# Patient Record
Sex: FEMALE | Race: WHITE | NOT HISPANIC OR LATINO
[De-identification: names, ages, dates, MRNs, and addresses within clinical notes are randomized per-mention and may not be internally consistent; named-entity substitution may affect disease eponyms.]

---

## 2022-06-23 PROBLEM — Z00.00 ENCOUNTER FOR PREVENTIVE HEALTH EXAMINATION: Status: ACTIVE | Noted: 2022-06-23

## 2022-06-24 ENCOUNTER — FORM ENCOUNTER (OUTPATIENT)
Age: 52
End: 2022-06-24

## 2022-06-25 ENCOUNTER — APPOINTMENT (OUTPATIENT)
Dept: ORTHOPEDIC SURGERY | Facility: CLINIC | Age: 52
End: 2022-06-25
Payer: COMMERCIAL

## 2022-06-25 ENCOUNTER — APPOINTMENT (OUTPATIENT)
Dept: MRI IMAGING | Facility: CLINIC | Age: 52
End: 2022-06-25
Payer: COMMERCIAL

## 2022-06-25 VITALS — HEIGHT: 63 IN | BODY MASS INDEX: 26.58 KG/M2 | WEIGHT: 150 LBS

## 2022-06-25 PROCEDURE — 72141 MRI NECK SPINE W/O DYE: CPT

## 2022-06-25 PROCEDURE — 72040 X-RAY EXAM NECK SPINE 2-3 VW: CPT

## 2022-06-25 PROCEDURE — 99204 OFFICE O/P NEW MOD 45 MIN: CPT

## 2022-06-25 RX ORDER — METHOCARBAMOL 500 MG/1
500 TABLET, FILM COATED ORAL
Qty: 20 | Refills: 0 | Status: ACTIVE | COMMUNITY
Start: 2022-06-25 | End: 1900-01-01

## 2022-06-25 RX ORDER — HYDROCODONE BITARTRATE AND ACETAMINOPHEN 5; 325 MG/1; MG/1
5-325 TABLET ORAL
Qty: 15 | Refills: 0 | Status: ACTIVE | COMMUNITY
Start: 2022-06-25 | End: 1900-01-01

## 2022-06-27 ENCOUNTER — APPOINTMENT (OUTPATIENT)
Dept: ORTHOPEDIC SURGERY | Facility: CLINIC | Age: 52
End: 2022-06-27

## 2022-06-27 NOTE — HISTORY OF PRESENT ILLNESS
[9] : 9 [Localized] : localized [de-identified] : 51 f with 1 week of left trap pain radiating to the left elbow posteriorly. notes that she visited the  and given medrol, robaxin without any relief. notes pain is severe. no NT. feels crackking in the back . pain at night. 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

## 2022-06-27 NOTE — DISCUSSION/SUMMARY
[de-identified] : appears to have significant cervical radiculopathy unresponsive to oral meds. MRI CS ro hnp. vicodin - advised limited supply. fu pain management\par \par A temporary and limited supply of narcotic pain medication was provided to the patient. Patient was advised to wean usage. Patient was advised of potential side effects, dependency and patient was advised refills may not be provided.\par \par The patient was advised that an advanced imaging study (MRI/CT/etc) will be ordered to evaluate potential pathology in the affected area(s).  The patient was further advised to follow up in the office to review the results of the study and determine further management that may be indicated.\par \par The patient was referred to a Pain Management Specialist for Chemical and Interventional Pain Management.\par

## 2022-06-27 NOTE — IMAGING
[de-identified] : Left shoulder exam: \par \par General: no distress\par Skin: no significant pertinent finding\par Inspection: no obvious deformity, no obvious masses, no swelling, no effusion, no atrophy\par ROM: \par    FF: 180 passive, limimted active due to pain\par    Abduction: 180\par    ER: 70\par    IR: T12\par Tenderness:\par    Anterior: +\par    Posterior: +\par    Lateral: +\par    Biceps: (-)\par    Trapezius: (-)\par    Scapula: (-)\par    AC joint: (-)\par    Crepitus with ROM: (-)\par Additional tests: \par    Neer's: (-)\par    Hawkin's: (-)\par    Garland's: (-)\par    Speed: (-)\par    Cross chest adduction: (-)\par    Liftoff: (-)\par    Bellypress: (-)\par    Abduction/External rotation: (-)\par    GIRD: (-)\par Strength: \par    FF: 5/5\par    ER: 5/5\par    IR: 5/5\par    Biceps: 5/5\par    Triceps: 5/5\par    Distal: 5/5\par Neuro: In tact to light touch throughout\par Vascularity: Extremity warm and well perfused\par \par \par Cervical spine exam: \par \par Skin: no significant pertinent finding\par Inspection: Abnormal alignment (kyphosis/lordosis): (-)   .   Atrophy: (-)   .   Masses: (-)\par ROM: \par    Pain:           Flexion/extension: +.   Rotation: +\par    Stiffness:   Flexion/extension: (-)   .   Rotation: (-)\par Tenderness: \par    Midline: (-)\par    Trapezial:      Right: +.  Left: (-)\par    Paraspinal:    Right: +.  Left: (-)   \par    Rhomboid:     Right: (-)  .  Left: (-)\par    Scapula:        Right: (-)  .  Left: (-)\par Strength: \par    Deltoid:          Right: 5/5   .  Left: 5/5\par    Biceps:          Right: 5/5   .  Left: 5/5\par    Triceps:         Right: 5/5   .  Left: 5/5\par    Wrist flex      Right: 5/5   .   Left: 5/5\par    Wrist ext:      Right: 5/5   .   Left: 5/5\par    Hand:            Right: 5/5   .   Left: 5/5\par Neuro: DTR's wnl.  Sensation to light touch grossly in tact in all distributions. \par    Delfina's: (-)\par    Spurling: (-)\par    Lhermitte: (-)\par Vascularity: Extremity warm and well perfused\par Gait: normal\par \par

## 2022-06-28 ENCOUNTER — APPOINTMENT (OUTPATIENT)
Dept: ORTHOPEDIC SURGERY | Facility: CLINIC | Age: 52
End: 2022-06-28
Payer: COMMERCIAL

## 2022-06-28 PROCEDURE — 99204 OFFICE O/P NEW MOD 45 MIN: CPT

## 2022-06-28 PROCEDURE — 99214 OFFICE O/P EST MOD 30 MIN: CPT

## 2022-06-28 RX ORDER — GABAPENTIN 100 MG/1
100 CAPSULE ORAL
Qty: 60 | Refills: 0 | Status: ACTIVE | COMMUNITY
Start: 2022-06-28 | End: 1900-01-01

## 2022-06-28 RX ORDER — METHOCARBAMOL 750 MG/1
750 TABLET, FILM COATED ORAL 3 TIMES DAILY
Qty: 21 | Refills: 0 | Status: ACTIVE | COMMUNITY
Start: 2022-06-28 | End: 1900-01-01

## 2022-06-28 NOTE — HISTORY OF PRESENT ILLNESS
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 Initial onset 6/18/22, after carrying a 20+ lb daypack all day during travel, she lives in [place], [place]. Feels immobility and limited ROM.  Difficulty with adl's.\par \par She is wearing a sling as directed by [facility] 6/21/22, xrays shoulder -> Medrol completed 6/27/22, Robaxin and diclofenac taken with 10% relief. Pain became worse, she developed constant numbness all fingers except thumb.  Vicodin 5/325 helped her the most. Also c/o numbness biceps area.  Prior hx neck/arm pain years ago for a night with the flu. \par \par MRI C-spine ([facility] 6/27/22)\par Impression:\par 1. C3-4: Central disc herniation indents the thecal sac.\par 2. C4-5: Disc bulge impinges the ventral spinal.\par 3. C5-6: Broad-based central disc herniation impinges the spinal cord. The herniation is superimposed on a disc \par bulge which narrows the neuroforaminal. Moderate disc space narrowing and vertebral end plate changes.\par 4. C6-7: 2 mm retrolisthesis of C6 on C7. Broad-based left subarticular and foraminal disc herniation effaces \par the left lateral recess and causes severe left foraminal stenosis. The herniation is superimposed on a disc bulge \par which narrows the right neuroforamen. Moderate disc space narrowing and vertebral endplate changes.\par 5. C7-T1: Central disc herniation indents the thecal sac.\par \par \par Dr. Dahl notes:\par 6/25/22: 51 f with 1 week of left trap pain radiating to the left elbow posteriorly. notes that she visited the  and given medrol, robaxin without any relief. notes pain is severe. no NT. feels cracking in the back . pain at night. 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

## 2022-06-28 NOTE — IMAGING
[de-identified] : Left shoulder exam: \par \par General: no distress\par Skin: no significant pertinent finding\par Inspection: no obvious deformity, no obvious masses, no swelling, no effusion, no atrophy\par ROM: \par    FF: 180 passive, limited active due to pain\par    Abduction: 180\par    ER: 70\par    IR: T12\par Tenderness:\par    Anterior: +\par    Posterior: +\par    Lateral: +\par    Biceps: (-)\par    Trapezius: (-)\par    Scapula: (-)\par    AC joint: (-)\par    Crepitus with ROM: (-)\par Additional tests: \par    Neer's: (-)\par    Hawkin's: (-)\par    Garland's: (-)\par    Speed: (-)\par    Cross chest adduction: (-)\par    Liftoff: (-)\par    Bellypress: (-)\par    Abduction/External rotation: (-)\par    GIRD: (-)\par Strength: \par    FF: 5/5\par    ER: 5/5\par    IR: 5/5\par    Biceps: 5/5\par    Triceps: 5/5\par    Distal: 5/5\par Neuro: In tact to light touch throughout\par Vascularity: Extremity warm and well perfused\par \par \par Cervical spine exam: \par \par Skin: no significant pertinent finding\par Inspection: Abnormal alignment (kyphosis/lordosis): (-)   .   Atrophy: (-)   .   Masses: (-)\par ROM: \par    Pain:           Flexion/extension: +.   Rotation: +\par    Stiffness:   Flexion/extension: (-)   .   Rotation: (-)\par Tenderness: \par    Midline: (-)\par    Trapezial:      Right: +.  Left: (-)\par    Paraspinal:    Right: +.  Left: (-)   \par    Rhomboid:     Right: (-)  .  Left: (-)\par    Scapula:        Right: (-)  .  Left: (-)\par Strength: \par    Deltoid:          Right: 5/5   .  Left: 5/5\par    Biceps:          Right: 5/5   .  Left: 5/5\par    Triceps:         Right: 5/5   .  Left: 5/5\par    Wrist flex      Right: 5/5   .   Left: 5/5\par    Wrist ext:      Right: 5/5   .   Left: 5/5\par    Hand:            Right: 5/5   .   Left: 5/5\par Neuro: DTR's wnl.  Sensation to light touch grossly in tact in all distributions. \par    Delfina's: (-)\par    Spurling: (-)\par    Lhermitte: (-)\par Vascularity: Extremity warm and well perfused\par Gait: normal

## 2022-06-28 NOTE — DISCUSSION/SUMMARY
[de-identified] : MRI C-spine findings and images reviewed for C6-7 hnp. \par \par MDP completed with minimal relief. \par Will see pain mgmt for further tx and possible MYRIAM's.\par Trial of Gabapentin 100mg, start qhs, may increase up to 300mg.\par D/C sling.\par \par

## 2022-06-30 ENCOUNTER — APPOINTMENT (OUTPATIENT)
Dept: PAIN MANAGEMENT | Facility: CLINIC | Age: 52
End: 2022-06-30

## 2022-06-30 VITALS — WEIGHT: 150 LBS | BODY MASS INDEX: 26.58 KG/M2 | HEIGHT: 63 IN

## 2022-06-30 PROCEDURE — 99214 OFFICE O/P EST MOD 30 MIN: CPT

## 2022-06-30 NOTE — HISTORY OF PRESENT ILLNESS
[Neck] : neck [10] : 10 [4] : 4 [Radiating] : radiating [Sharp] : sharp [Tingling] : tingling [Constant] : constant [Meds] : meds [Walking] : walking [] : no [FreeTextEntry1] : left  [FreeTextEntry6] : numbness [FreeTextEntry7] : left upper back shoulder arms to the fingers,  [FreeTextEntry9] : methocarbomol ,hydrocodone, gabapentin, advil, tylenol  [de-identified] : C MRI

## 2022-06-30 NOTE — DISCUSSION/SUMMARY
[de-identified] : After discussing various treatment options with the patient including but not limited to oral medications, physical therapy, exercise modalities as well as interventional spinal injections, we have decided with the following plan:\par \par - Continue home exercises, stretching, activity modification, physical therapy, and conservative care.\par - MRI report and/or images was reviewed and discussed with the patient.\par - Follow-up as needed.\par

## 2022-06-30 NOTE — PHYSICAL EXAM
[de-identified] : Constitutional; Appears well, no apparent distress\par Ability to communicate: Normal \par Respiratory: non-labored breathing\par Skin: No rash noted\par Head: Normocephalic, atraumatic\par Neck: no visible thyroid enlargement\par Eyes: Extraocular movements intact\par Neurologic: Alert and oriented x3\par Psychiatric: normal mood, affect and behavior \par \par  [] : negative Spurling

## 2022-07-07 ENCOUNTER — APPOINTMENT (OUTPATIENT)
Dept: INTERNAL MEDICINE | Facility: CLINIC | Age: 52
End: 2022-07-07

## 2022-07-07 ENCOUNTER — NON-APPOINTMENT (OUTPATIENT)
Age: 52
End: 2022-07-07

## 2022-07-07 VITALS
OXYGEN SATURATION: 98 % | TEMPERATURE: 98.7 F | BODY MASS INDEX: 25.91 KG/M2 | RESPIRATION RATE: 16 BRPM | WEIGHT: 146.25 LBS | HEART RATE: 86 BPM | HEIGHT: 63 IN

## 2022-07-07 VITALS — DIASTOLIC BLOOD PRESSURE: 62 MMHG | SYSTOLIC BLOOD PRESSURE: 128 MMHG

## 2022-07-07 DIAGNOSIS — K29.70 GASTRITIS, UNSPECIFIED, W/OUT BLEEDING: ICD-10-CM

## 2022-07-07 PROCEDURE — 99203 OFFICE O/P NEW LOW 30 MIN: CPT

## 2022-07-07 RX ORDER — DICLOFENAC SODIUM 75 MG/1
75 TABLET, DELAYED RELEASE ORAL
Qty: 6 | Refills: 0 | Status: ACTIVE | COMMUNITY
Start: 2022-06-22

## 2022-07-07 RX ORDER — METHYLPREDNISOLONE 4 MG/1
4 TABLET ORAL
Qty: 21 | Refills: 0 | Status: ACTIVE | COMMUNITY
Start: 2022-06-22

## 2022-07-07 RX ORDER — DESOGESTREL AND ETHINYL ESTRADIOL 0.15-0.03
0.15-3 KIT ORAL
Qty: 28 | Refills: 0 | Status: ACTIVE | COMMUNITY
Start: 2021-07-20

## 2022-07-07 RX ORDER — GABAPENTIN 100 MG/1
100 CAPSULE ORAL
Qty: 60 | Refills: 0 | Status: ACTIVE | COMMUNITY
Start: 2022-07-07 | End: 1900-01-01

## 2022-07-07 RX ORDER — METHOCARBAMOL 750 MG/1
750 TABLET, FILM COATED ORAL TWICE DAILY
Qty: 20 | Refills: 0 | Status: ACTIVE | COMMUNITY
Start: 2022-07-07 | End: 1900-01-01

## 2022-07-07 NOTE — PLAN
[FreeTextEntry1] : Explained to the patient that if she has chronic pain and her pain is not well managed that she needs a pain management doctor.  I offered to give her a list of doctors who she could see.  When asked about follow-up I told her she needs to see pain management.  She then began to accuse me of saying that I believe her to be a difficult patient.  I told her that I had never sent that to her and that she is being confrontational for absolutely no reason.  She is very condescending.  It was at this point that she took all of her papers back and stated that she will not be coming back to this office

## 2022-07-07 NOTE — HISTORY OF PRESENT ILLNESS
[FreeTextEntry1] : NOT A PHYSICAL\par Follow-up ER visit [de-identified] : 51-year-old female who presented today after an ER visit.  She states that she was told to follow-up with her primary care physician.  She proceeded to tell me the reasons that she no longer saw her prior PCP.  I explained to her that I wanted to focus on the reason for her being here today not her problems with the other physician or his office.\par The history below is a combination of what I was able to gather from the patient as well as from the orthopedic and pain management notes here in all scripts.  The patient spent a great deal of time discussing how much time we are allowed for the visit as well as her grievances with the emergency room and the pain management doctor.  She also spent quite a lot of time discussing the difficulties she had scheduling the appointments in between taking pain medications.  She also spent quite a lot of time discussing the difficulty of getting appointments within 1 or 2 days.  She also complained of only getting a short supply of pain medications from any of the doctors.  I did explain to her that for her acute pain in [facility] we are allowed to prescribe 5 days of pain medications.  If she has chronic pain then she does need to see a pain management doctor who can give her medications for longer periods of time.  But she would need to be an established patient with this doctor.\par She handed me papers with her pain medication regimen as well as her history but at the end of the visit she took them from me and would not allow me or the staff to make any copies.  Therefore I was not able to update her past medical history, surgical history, social history, medications or allergies.  She did not answer almost any questions for me.\par She went to the ER on 6/22/22 at [facility] after a neck injury while on a trip.  She never described actual injury to me but told me that for 6 to 7 years she carried a backpack that was heavy and is now carrying a 20 pound backpack and developed pain.  This took her to the emergency room.  She states she was holding her shoulder and therefore they did a shoulder x-ray but she did not have pain in her shoulder.  She states that she had neck pain but she was not evaluated for the neck pain.  She states that she was not taking seriously because she refused an injection.  She states that she was discharged on methocarbamol and told to follow up with ortho.  She saw Ortho and an MRI was ordered.  She has disc herniations of the cervical spine.  She was referred to pain management.  Pain management recommended conservative treatment with PT. she states she is taking Advil and Tylenol every 4 hours \par She has a PT appointment scheduled.

## 2022-07-07 NOTE — PHYSICAL EXAM
[No Acute Distress] : no acute distress [Normal Voice/Communication] : normal voice/communication [Person] : oriented to person [Place] : oriented to place [Time] : oriented to time [Agitated] : agitated [Tangentiality] : tangentiality

## 2022-07-19 ENCOUNTER — APPOINTMENT (OUTPATIENT)
Dept: ORTHOPEDIC SURGERY | Facility: CLINIC | Age: 52
End: 2022-07-19

## 2022-07-19 VITALS — HEIGHT: 67 IN | WEIGHT: 146 LBS | BODY MASS INDEX: 22.91 KG/M2

## 2022-07-19 DIAGNOSIS — M54.2 CERVICALGIA: ICD-10-CM

## 2022-07-19 PROCEDURE — 99213 OFFICE O/P EST LOW 20 MIN: CPT

## 2022-07-19 RX ORDER — GABAPENTIN 100 MG/1
100 CAPSULE ORAL
Qty: 60 | Refills: 0 | Status: ACTIVE | COMMUNITY
Start: 2022-07-19 | End: 1900-01-01

## 2022-07-19 NOTE — HISTORY OF PRESENT ILLNESS
[9] : 9 [Localized] : localized [Intermittent] : intermittent [Heat] : heat [de-identified] : 7/19/22: Here for follow up neck pain, some improvement in pain and spasms. She feels her motion is better than it was. Numbness is unchanged. She did some PT and acupuncture. 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 Initial onset 6/18/22, after carrying a 20+ lb daypack all day during travel, she lives in [place], [place]. Feels immobility and limited ROM.  Difficulty with adl's.\par \par She is wearing a sling as directed by [facility] ER 6/21/22, xrays shoulder -> Medrol completed 6/27/22, Robaxin and diclofenac taken with 10% relief. Pain became worse, she developed constant numbness all fingers except thumb.  Vicodin 5/325 helped her the most. Also c/o numbness biceps area.  Prior hx neck/arm pain years ago for a night with the flu. \par \par MRI C-spine ([facility] 6/27/22)\par Impression:\par 1. C3-4: Central disc herniation indents the thecal sac.\par 2. C4-5: Disc bulge impinges the ventral spinal.\par 3. C5-6: Broad-based central disc herniation impinges the spinal cord. The herniation is superimposed on a disc \par bulge which narrows the neuroforaminal. Moderate disc space narrowing and vertebral end plate changes.\par 4. C6-7: 2 mm retrolisthesis of C6 on C7. Broad-based left subarticular and foraminal disc herniation effaces \par the left lateral recess and causes severe left foraminal stenosis. The herniation is superimposed on a disc bulge \par which narrows the right neuroforamen. Moderate disc space narrowing and vertebral endplate changes.\par 5. C7-T1: Central disc herniation indents the thecal sac.\par \par \par Dr. Dahl notes:\par 6/25/22: 51 f with 1 week of left trap pain radiating to the left elbow posteriorly. notes that she visited the  and given medrol, robaxin without any relief. notes pain is severe. no NT. feels cracking in the back . pain at night. \par \par Occupation: massage therapist [] : no [FreeTextEntry1] : Back [FreeTextEntry5] : pt c.o pain in back and arm \par states June 18 she was carrying a 20 pound day pack [FreeTextEntry9] : massage

## 2022-07-19 NOTE — DISCUSSION/SUMMARY
[de-identified] : MRI C-spine findings and images reviewed for C6-7 hnp. \par \par MDP completed with minimal relief. \par Will see pain mgmt for further tx and possible MYRIAM's.\par She started PT, will continue PT.\par Continue Gabapentin 100mg qhs, may increase up to 300mg.\par Follow up with PMD recommended for other medical issues.\par \par

## 2022-08-02 ENCOUNTER — APPOINTMENT (OUTPATIENT)
Dept: INTERNAL MEDICINE | Facility: CLINIC | Age: 52
End: 2022-08-02

## 2022-08-05 ENCOUNTER — APPOINTMENT (OUTPATIENT)
Dept: ORTHOPEDIC SURGERY | Facility: CLINIC | Age: 52
End: 2022-08-05

## 2022-09-02 ENCOUNTER — APPOINTMENT (OUTPATIENT)
Dept: ORTHOPEDIC SURGERY | Facility: CLINIC | Age: 52
End: 2022-09-02

## 2022-09-02 VITALS — HEIGHT: 67 IN | WEIGHT: 140 LBS | BODY MASS INDEX: 21.97 KG/M2

## 2022-09-02 DIAGNOSIS — M54.12 RADICULOPATHY, CERVICAL REGION: ICD-10-CM

## 2022-09-02 DIAGNOSIS — M50.20 OTHER CERVICAL DISC DISPLACEMENT, UNSPECIFIED CERVICAL REGION: ICD-10-CM

## 2022-09-02 PROCEDURE — 99214 OFFICE O/P EST MOD 30 MIN: CPT

## 2022-09-27 PROBLEM — M54.12 CERVICAL RADICULAR PAIN: Status: ACTIVE | Noted: 2022-06-25

## 2022-09-27 PROBLEM — M50.20 HNP (HERNIATED NUCLEUS PULPOSUS), CERVICAL: Status: ACTIVE | Noted: 2022-06-28

## 2022-09-27 NOTE — DATA REVIEWED
[MRI] : MRI [Cervical Spine] : cervical spine [I independently reviewed and interpreted images and report] : I independently reviewed and interpreted images and report [FreeTextEntry1] : moderate left c6-7 hnp. c5-6 mod hnp

## 2022-09-27 NOTE — DISCUSSION/SUMMARY
[de-identified] : extensive conversation regarding patients cervical pathology. patient has significant concerns of all levels related to her conditions. mri anatomy, and pathology explained with images and models in detail. numerous questions answered in detail. PT for now. consider pain management for epidural if pain persists. discussed short andd long term natural hx. fu prn. \par \par ------------------------------------------------------------------------------------------------------------------------------------------------------\par \par The patient was advised of the diagnosis.  The natural history of the pathology was explained in full. All questions were answered.  The risks and benefits of conservative and interventional treatment alternatives were explained to the patient\par \par ------------------------------------------------------------------------------------------------------------------------------------------------------\par \par MRI(s) and/or other advanced imaging studies (CT/etc) were reviewed with the patient. Implications of the study(ies) together with the patient's clinical picture were discussed to formulate a working diagnosis and management options were detailed.\par

## 2022-09-27 NOTE — HISTORY OF PRESENT ILLNESS
[Neck] : neck [9] : 9 [Localized] : localized [de-identified] : notes that she is doing much better. she did acupuncture, traction and PT. she had mri of the neck. no pain at this time.\par \par ------------------------------------------------------------------------------------------------------------------------------------------------------\par \par 51 f with 1 week of left trap pain radiating to the left elbow posteriorly. notes that she visited the  and given medrol, robaxin without any relief. notes pain is severe. no NT. feels crackking in the back . pain at night. 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

## 2022-09-27 NOTE — IMAGING
[de-identified] : Left shoulder exam: \par \par General: no distress\par Skin: no significant pertinent finding\par Inspection: no obvious deformity, no obvious masses, no swelling, no effusion, no atrophy\par ROM: \par    FF: 180 \par    Abduction: 180\par    ER: 70\par    IR: T12\par Tenderness:\par    Anterior: -\par    Posterior: +\par    Lateral: +\par    Biceps: (-)\par    Trapezius: (-)\par    Scapula: (-)\par    AC joint: (-)\par    Crepitus with ROM: (-)\par Additional tests: \par    Neer's: (-)\par    Hawkin's: (-)\par    Garland's: (-)\par    Speed: (-)\par    Cross chest adduction: (-)\par    Liftoff: (-)\par    Bellypress: (-)\par    Abduction/External rotation: (-)\par    GIRD: (-)\par Strength: \par    FF: 5/5\par    ER: 5/5\par    IR: 5/5\par    Biceps: 5/5\par    Triceps: 5/5\par    Distal: 5/5\par Neuro: In tact to light touch throughout\par Vascularity: Extremity warm and well perfused\par \par \par Cervical spine exam: \par \par Skin: no significant pertinent finding\par Inspection: Abnormal alignment (kyphosis/lordosis): (-)   .   Atrophy: (-)   .   Masses: (-)\par ROM: \par    Pain:           Flexion/extension: +.   Rotation: +\par    Stiffness:   Flexion/extension: (-)   .   Rotation: (-)\par Tenderness: \par    Midline: (-)\par    Trapezial:      Right: +.  Left: +\par    Paraspinal:    Right: +.  Left: (-)   \par    Rhomboid:     Right: (-)  .  Left: (-)\par    Scapula:        Right: (-)  .  Left: (-)\par Strength: \par    Deltoid:          Right: 5/5   .  Left: 5/5\par    Biceps:          Right: 5/5   .  Left: 5/5\par    Triceps:         Right: 5/5   .  Left: 5/5\par    Wrist flex      Right: 5/5   .   Left: 5/5\par    Wrist ext:      Right: 5/5   .   Left: 5/5\par    Hand:            Right: 5/5   .   Left: 5/5\par Neuro: DTR's wnl.  Sensation to light touch grossly in tact in all distributions. \par    Delfina's: (-)\par    Spurling: (-)\par    Lhermitte: (-)\par Vascularity: Extremity warm and well perfused\par Gait: normal\par \par